# Patient Record
Sex: MALE | Race: WHITE | HISPANIC OR LATINO | Employment: OTHER | URBAN - METROPOLITAN AREA
[De-identification: names, ages, dates, MRNs, and addresses within clinical notes are randomized per-mention and may not be internally consistent; named-entity substitution may affect disease eponyms.]

---

## 2019-10-04 ENCOUNTER — HOSPITAL ENCOUNTER (EMERGENCY)
Facility: MEDICAL CENTER | Age: 74
End: 2019-10-04
Attending: EMERGENCY MEDICINE
Payer: MEDICARE

## 2019-10-04 VITALS
DIASTOLIC BLOOD PRESSURE: 81 MMHG | WEIGHT: 211.2 LBS | BODY MASS INDEX: 33.94 KG/M2 | OXYGEN SATURATION: 97 % | HEART RATE: 82 BPM | RESPIRATION RATE: 16 BRPM | HEIGHT: 66 IN | SYSTOLIC BLOOD PRESSURE: 143 MMHG | TEMPERATURE: 99.1 F

## 2019-10-04 DIAGNOSIS — K02.9 DENTAL CARIES: ICD-10-CM

## 2019-10-04 DIAGNOSIS — K08.89 PAIN, DENTAL: ICD-10-CM

## 2019-10-04 PROCEDURE — 99283 EMERGENCY DEPT VISIT LOW MDM: CPT

## 2019-10-04 PROCEDURE — A9270 NON-COVERED ITEM OR SERVICE: HCPCS | Performed by: EMERGENCY MEDICINE

## 2019-10-04 PROCEDURE — 700102 HCHG RX REV CODE 250 W/ 637 OVERRIDE(OP): Performed by: EMERGENCY MEDICINE

## 2019-10-04 RX ORDER — AMOXICILLIN 500 MG/1
500 CAPSULE ORAL 3 TIMES DAILY
Qty: 21 CAP | Refills: 0 | Status: SHIPPED | OUTPATIENT
Start: 2019-10-04 | End: 2019-10-11

## 2019-10-04 RX ORDER — AMOXICILLIN 500 MG/1
500 CAPSULE ORAL ONCE
Status: COMPLETED | OUTPATIENT
Start: 2019-10-04 | End: 2019-10-04

## 2019-10-04 RX ORDER — NAPROXEN 500 MG/1
500 TABLET ORAL 2 TIMES DAILY WITH MEALS
Qty: 14 TAB | Refills: 0 | Status: SHIPPED | OUTPATIENT
Start: 2019-10-04 | End: 2019-10-11

## 2019-10-04 RX ORDER — HYDROCODONE BITARTRATE AND ACETAMINOPHEN 5; 325 MG/1; MG/1
1 TABLET ORAL ONCE
Status: COMPLETED | OUTPATIENT
Start: 2019-10-04 | End: 2019-10-04

## 2019-10-04 RX ADMIN — AMOXICILLIN 500 MG: 500 CAPSULE ORAL at 23:49

## 2019-10-04 RX ADMIN — HYDROCODONE BITARTRATE AND ACETAMINOPHEN 1 TABLET: 5; 325 TABLET ORAL at 23:49

## 2019-10-04 SDOH — HEALTH STABILITY: MENTAL HEALTH: HOW MANY STANDARD DRINKS CONTAINING ALCOHOL DO YOU HAVE ON A TYPICAL DAY?: 1 OR 2

## 2019-10-04 SDOH — HEALTH STABILITY: MENTAL HEALTH: HOW OFTEN DO YOU HAVE 6 OR MORE DRINKS ON ONE OCCASION?: NEVER

## 2019-10-04 SDOH — HEALTH STABILITY: MENTAL HEALTH: HOW OFTEN DO YOU HAVE A DRINK CONTAINING ALCOHOL?: 2-4 TIMES A MONTH

## 2019-10-05 NOTE — ED TRIAGE NOTES
Sunil Mitchell  73 y.o.  male  Chief Complaint   Patient presents with   • Dental Pain     x 3 days     Present to triage c/o dental pain ( left lower jaw ) x 3 days.

## 2019-10-05 NOTE — ED PROVIDER NOTES
"ED Provider Note    CHIEF COMPLAINT  Chief Complaint   Patient presents with   • Dental Pain     x 3 days       HPI  Sunil Mitchell is a 73 y.o. male who presents left lower dental pain for the past 3 days.  Has a history of very poor dentition.  Had recent dental extraction to that same site.  Had significant dental decay to neighboring teeth that were not extracted at the time.  He was last on antibiotics about 1 month ago.  No facial erythema or swelling.  No fevers.    REVIEW OF SYSTEMS  See HPI for further details. All other systems are negative.     PAST MEDICAL HISTORY   has a past medical history of Hypertension.    SOCIAL HISTORY  Social History     Tobacco Use   • Smoking status: Former Smoker     Last attempt to quit: 10/4/2008     Years since quittin.0   • Smokeless tobacco: Never Used   Substance and Sexual Activity   • Alcohol use: Yes     Frequency: 2-4 times a month     Drinks per session: 1 or 2     Binge frequency: Never   • Drug use: Never   • Sexual activity: Not on file       SURGICAL HISTORY   has a past surgical history that includes appendectomy and acl repair.    CURRENT MEDICATIONS  Home Medications     Reviewed by Kameron Bautista R.N. (Registered Nurse) on 10/04/19 at 2248  Med List Status: Complete   Medication Last Dose Status        Patient Alan Taking any Medications                       ALLERGIES  No Known Allergies    PHYSICAL EXAM  VITAL SIGNS: BP (!) 170/105   Pulse 92   Temp 37.3 °C (99.1 °F) (Temporal)   Resp 16   Ht 1.676 m (5' 6\")   Wt 95.8 kg (211 lb 3.2 oz)   SpO2 97%   BMI 34.09 kg/m²   Pulse ox interpretation: I interpret this pulse ox as normal.  Constitutional: Alert in no apparent distress.  HENT: No signs of trauma, Bilateral external ears normal, Nose normal. No palpable abscess along the gumline.  Multiple dental caries and significant dental decay.  Overall very poor dentition.  Tenderness along the left lower molars.  Eyes: Pupils are equal and " "reactive, Conjunctiva normal, Non-icteric.    Cardiovascular: Regular rate and rhythm.   Thorax & Lungs: Normal breath sounds, No respiratory distress  Skin: Warm, Dry, No erythema, No rash.   Extremities: Intact distal pulses, No edema, No tenderness, No cyanosis  Neurologic: Alert, No focal deficits noted.       DIAGNOSTIC STUDIES / PROCEDURES      LABS  Labs Reviewed - No data to display      RADIOLOGY  No orders to display         COURSE & MEDICAL DECISION MAKING    Medications   amoxicillin (AMOXIL) capsule 500 mg (has no administration in time range)   HYDROcodone-acetaminophen (NORCO) 5-325 MG per tablet 1 Tab (has no administration in time range)       Pertinent Labs & Imaging studies reviewed. (See chart for details)  73 y.o. male presenting with dental pain in the setting of significant dental caries and very poor dentition.  No facial erythema or swelling.  No palpable abscess along the gumline.  No signs of sepsis.  Patient likely with infected pulpitis versus early periapical abscess.  Will treat with antibiotics and NSAID therapy over the next few days.  He was given a dose of hydrocodone here for pain management to help him sleep this evening.    The patient will not drink alcohol nor drive with prescribed medications.   The patient was instructed to follow-up with primary care physician for further management.  To return immediately for any worsening symptoms or development of any other concerning signs or symptoms. The patient verbalizes understanding in their own words.    BP (!) 170/105   Pulse 92   Temp 37.3 °C (99.1 °F) (Temporal)   Resp 16   Ht 1.676 m (5' 6\")   Wt 95.8 kg (211 lb 3.2 oz)   SpO2 97%   BMI 34.09 kg/m²     The patient was referred to primary care where they will receive further BP management.      [facility], Emergency Dept  [address] [facility] [address] [phone]    As needed, If symptoms worsen    Dentist    Schedule an appointment as " soon as possible for a visit         FINAL IMPRESSION  1. Pain, dental    2. Dental caries            Electronically signed by: Markos Gupta, 10/4/2019 11:11 PM